# Patient Record
Sex: MALE | Race: BLACK OR AFRICAN AMERICAN | Employment: OTHER | URBAN - METROPOLITAN AREA
[De-identification: names, ages, dates, MRNs, and addresses within clinical notes are randomized per-mention and may not be internally consistent; named-entity substitution may affect disease eponyms.]

---

## 2020-11-06 ENCOUNTER — APPOINTMENT (OUTPATIENT)
Dept: GENERAL RADIOLOGY | Age: 65
End: 2020-11-06
Attending: PHYSICIAN ASSISTANT
Payer: MEDICARE

## 2020-11-06 ENCOUNTER — HOSPITAL ENCOUNTER (EMERGENCY)
Age: 65
Discharge: HOME | End: 2020-11-06
Attending: EMERGENCY MEDICINE
Payer: MEDICARE

## 2020-11-06 VITALS
DIASTOLIC BLOOD PRESSURE: 97 MMHG | OXYGEN SATURATION: 99 % | HEART RATE: 70 BPM | SYSTOLIC BLOOD PRESSURE: 149 MMHG | RESPIRATION RATE: 18 BRPM | TEMPERATURE: 97.6 F

## 2020-11-06 DIAGNOSIS — M54.42 ACUTE LEFT-SIDED LOW BACK PAIN WITH LEFT-SIDED SCIATICA: Primary | ICD-10-CM

## 2020-11-06 PROCEDURE — 99283 EMERGENCY DEPT VISIT LOW MDM: CPT

## 2020-11-06 PROCEDURE — 74011250637 HC RX REV CODE- 250/637: Performed by: PHYSICIAN ASSISTANT

## 2020-11-06 PROCEDURE — 72100 X-RAY EXAM L-S SPINE 2/3 VWS: CPT

## 2020-11-06 RX ORDER — HYDROCODONE BITARTRATE AND ACETAMINOPHEN 5; 325 MG/1; MG/1
1 TABLET ORAL
Status: COMPLETED | OUTPATIENT
Start: 2020-11-06 | End: 2020-11-06

## 2020-11-06 RX ADMIN — HYDROCODONE BITARTRATE AND ACETAMINOPHEN 1 TABLET: 5; 325 TABLET ORAL at 11:01

## 2020-11-06 NOTE — ED PROVIDER NOTES
[facility] EMERGENCY DEPT    Date: 11/6/2020  Patient Name: Dawson Hull    History of Presenting Illness     Chief Complaint   Patient presents with    Back Pain     76 y.o. male with a past medical history of Diabetes, Hypertension, and chronic back pain presents the ED complaining of left lower back pain radiating down his left leg for the past week. Patient states he has had this exact same thing happen on the right side. He describes a burning pain down his posterior left leg. He sees Dr. Julian Bennett in orthopedics, states he has an appointment with him coming up. Patient currently taking Percocet for his pain. Denies any numbness, weakness, bowel or bladder function loss, chest pain, abdominal pain, other symptoms. Patient denies any other associated signs or symptoms. Patient denies any other complaints. Nursing notes regarding the HPI and triage nursing notes were reviewed. Prior medical records were reviewed. Current Outpatient Medications   Medication Sig Dispense Refill    metFORMIN (GLUCOPHAGE) 500 mg tablet Take 250 mg by mouth two (2) times daily (with meals).  oxyCODONE ER (OXYCONTIN) 40 mg ER tablet Take 40 mg by mouth every twelve (12) hours.  Venlafaxine 75 mg tr24 Take  by mouth daily.  lovastatin (MEVACOR) 40 mg tablet Take 40 mg by mouth two (2) times a day.  lisinopril-hydrochlorothiazide (PRINZIDE, ZESTORETIC) 20-25 mg per tablet Take 1 Tab by mouth daily.  NALBUPHINE HCL (NUBAIN IJ) by Injection route.  naproxen (NAPROSYN) 500 mg tablet Take 500 mg by mouth two (2) times daily (with meals).          Past History     Past Medical History:  Past Medical History:   Diagnosis Date    Diabetes ([address]     Hypercholesteremia     Hypertension     Stress        Past Surgical History:  Past Surgical History:   Procedure Laterality Date    COLONOSCOPY N/A 8/12/2016    COLONOSCOPY performed by George Mccord MD at [address] [place] [address] COLONOSCOPY Family History:  History reviewed. No pertinent family history. Social History:  Social History     Tobacco Use    Smoking status: Current Every Day Smoker     Packs/day: 0.50    Smokeless tobacco: Never Used   Substance Use Topics    Alcohol use: No    Drug use: No       Allergies:  No Known Allergies    Patient's primary care provider (as noted in EPIC):  Josias Virgen NP    Review of Systems   Constitutional:  Denies malaise, fever, chills. Neck:  Denies injury or pain. Chest:  Denies injury. Cardiac:  Denies chest pain or palpitations. Respiratory:  Denies cough, wheezing, difficulty breathing, shortness of breath. GI/ABD:  Denies injury, pain, distention, nausea, vomiting, diarrhea. :  Denies injury, pain, dysuria or urgency. Back:  + Left low back pain rating down left posterior leg. Pelvis:  Denies injury or pain. Extremity/MS:  Denies injury or pain. Neuro:  Denies neurologic symptoms/deficits/paresthesias. Skin: Denies injury, rash, itching or skin changes. All other systems negative as reviewed. Visit Vitals  BP (!) 149/97   Pulse 70   Temp 97.6 °F (36.4 °C)   Resp 18   SpO2 99%       PHYSICAL EXAM:  CONSTITUTIONAL:  Alert, in no apparent distress;  well developed;  well nourished. HEAD:  Normocephalic, atraumatic. EYES:  EOMI. Non-icteric sclera. Normal conjunctiva. ENTM: Mouth:  mucous membranes moist.  NECK:  Supple  RESPIRATORY:  Chest clear, equal breath sounds, good air movement. Without wheezes, rhonchi, rales. CARDIOVASCULAR:  Regular rate and rhythm. No murmurs, rubs, or gallops. GI:  Normal bowel sounds, abdomen soft and non-tender. No rebound or guarding. BACK:  Lower left paralumbar/SI joint region with reproducible tenderness to palpation. No midline vertebral bony point tenderness or step-off. Normal bilateral straight leg raise. UPPER EXT:  Normal inspection. LOWER EXT:  No edema, no calf tenderness. Distal pulses intact. 2+ pedal pulses noted equal bilaterally. NEURO:  Moves all four extremities, and grossly normal motor exam.  SKIN:  No rashes;  Normal for age. PSYCH:  Alert and normal affect. DIFFERENTIAL DIAGNOSES/ MEDICAL DECISION MAKING:  Low back pain from myofascial strain/ sprain, muscle spasm, vertebral disc problem, neuropathy to include sciatica, abscess/infection, referred retroperitoneal pain from pyelonephritis or ureterolithiasis, other etiologies versus a combination of the above (example: myofascial strain/ sprain as well as muscle spasm). IMPRESSION AND MEDICAL DECISION MAKING:  No perianal decreased sensation nor bowel/bladder incontinence to suggest a neurological emergency. The patient does not have fever, no other signs of infection to suggest an epidural or other back abscess that would require emergent intervention. The patient denies being an IVDA. Xr Spine Lumb 2 Or 3 V    Result Date: 11/6/2020  EXAM: LUMBAR SPINE RADIOGRAPHS CLINICAL INDICATION/HISTORY: pain -Additional: None COMPARISON: None TECHNIQUE: 3 views of the lumbar spine _______________ FINDINGS: VERTEBRAE AND ALIGNMENT: Mild left apex levoscoliosis with L3 of the apex segment. Chronic appearing mild loss of the superior L5 endplate. Remaining vertebral body heights are grossly preserved. Multilevel degenerative anterior posterior endplate spondylosis with L3-S1 degenerative facet osteoarthrosis. No discrete listhesis. DISC SPACES: Mild loss of posterior L4-5 and L5-S1 disc height PARASPINOUS SOFT TISSUES: Aortic metallic atherosclerosis. ADDITIONAL: None. _______________     IMPRESSION: 1. Chronic appearing mild superior L5 endplate compression deformity. 2. Mild levoscoliosis with multilevel mild to moderate degenerative spondylosis, greatest from L3 to S1. Mild disc space narrowing at L4-S1.      Based upon the patients presentation with noted HPI and PE, along with the work up done in the emergency department, I believe that the patient is having back pain secondary to chronic changes. Also sounds to have sciatica. Patient may continue with his Naprosyn and oxycodone at home, follow-up with orthopedics. No sign of emergent process. Diagnosis:   1. Acute left-sided low back pain with left-sided sciatica      Disposition: Discharge    Follow-up Information     Follow up With Specialties Details Why Contact Info    Sergey Hare MD Orthopedic Surgery In 3 days  [place] [address]  [address] [place] [address] EMERGENCY DEPT Emergency Medicine  If symptoms worsen [address]  [phone]          Discharge Medication List as of 11/6/2020 11:42 AM      CONTINUE these medications which have NOT CHANGED    Details   metFORMIN (GLUCOPHAGE) 500 mg tablet Take 250 mg by mouth two (2) times daily (with meals). , Historical Med      oxyCODONE ER (OXYCONTIN) 40 mg ER tablet Take 40 mg by mouth every twelve (12) hours. , Historical Med      Venlafaxine 75 mg tr24 Take  by mouth daily. , Historical Med      lovastatin (MEVACOR) 40 mg tablet Take 40 mg by mouth two (2) times a day., Historical Med      lisinopril-hydrochlorothiazide (PRINZIDE, ZESTORETIC) 20-25 mg per tablet Take 1 Tab by mouth daily. , Historical Med      NALBUPHINE HCL (NUBAIN IJ) by Injection route., Historical Med      naproxen (NAPROSYN) 500 mg tablet Take 500 mg by mouth two (2) times daily (with meals). , Historical Med           Yovana Brownma